# Patient Record
Sex: FEMALE | Race: WHITE | Employment: UNEMPLOYED | URBAN - METROPOLITAN AREA
[De-identification: names, ages, dates, MRNs, and addresses within clinical notes are randomized per-mention and may not be internally consistent; named-entity substitution may affect disease eponyms.]

---

## 2021-12-09 ENCOUNTER — THERAPY VISIT (OUTPATIENT)
Dept: PHYSICAL THERAPY | Facility: CLINIC | Age: 31
End: 2021-12-09
Payer: COMMERCIAL

## 2021-12-09 DIAGNOSIS — M25.531 RIGHT WRIST PAIN: ICD-10-CM

## 2021-12-09 DIAGNOSIS — M25.521 PAIN OF BOTH ELBOWS: ICD-10-CM

## 2021-12-09 DIAGNOSIS — M25.522 PAIN OF BOTH ELBOWS: ICD-10-CM

## 2021-12-09 PROCEDURE — 97140 MANUAL THERAPY 1/> REGIONS: CPT | Mod: GP | Performed by: PHYSICAL THERAPIST

## 2021-12-09 PROCEDURE — 97161 PT EVAL LOW COMPLEX 20 MIN: CPT | Mod: GP | Performed by: PHYSICAL THERAPIST

## 2021-12-09 PROCEDURE — 97110 THERAPEUTIC EXERCISES: CPT | Mod: GP | Performed by: PHYSICAL THERAPIST

## 2021-12-09 NOTE — PROGRESS NOTES
[facility]    OUTPATIENT Physical Therapy ORTHOPEDIC EVALUATION  PLAN OF TREATMENT FOR OUTPATIENT REHABILITATION  (COMPLETE FOR INITIAL CLAIMS ONLY)  Patient's Last Name, First Name, M.I.  YOB: 1990  Julissa Crowley    Provider s Name:  [facility]   Medical Record No.  [id number]   Start of Care Date:  12/09/21   Onset Date:   12/02/21   Type:     _X__PT   ___OT Medical Diagnosis:  No diagnosis found.     Treatment Diagnosis:  B wrist pain consistent with extensor tenosynovitis, decreased mobility of proximal carpal row, and proximal radio-ulnar restrictions         Goals:     12/09/21 0500   Body Part   Goals listed below are for R wrist   Other Goal   Activity Getting dressed   Previous Functional Level No restrictions   Performance Level Able to pull up pants IND   Current Functional Level Unable to pull up pants IND   STG Target Performance Pull up pants IND   Performance Level Difficulty level 5/10   Rationale To return to IND with self-care   Due Date 12/30/21   LTG Target Performance Dressing IND   Performance Level Difficulty level while dressing 2-3/10   Rationale To return to IND personal cares   Due Date 01/20/22       Therapy Frequency:  1x/wk  Predicted Duration of Therapy Intervention:  6wks    Hira Barron, PT                 I CERTIFY THE NEED FOR THESE SERVICES FURNISHED UNDER        THIS PLAN OF TREATMENT AND WHILE UNDER MY CARE .             Physician Signature               Date    X_____________________________________________________                             Certification Date From:  12/09/21   Certification Date To:  01/20/22    Referring Provider:  Karol Galdamez    Initial Assessment        See Epic Evaluation SOC Date: 12/09/21

## 2021-12-09 NOTE — PROGRESS NOTES
Physical Therapy Initial Evaluation  Subjective:  The history is provided by the patient. No  was used.   Patient Health History  Julissa Crowley being seen for R hand/finger.     Problem began: 11/9/2021.   Problem occurred: Started with numbness, then swelling, and stiffness   Pain is reported as 7/10 on pain scale.  General health as reported by patient is good.  Pertinent medical history includes: asthma, depression, diabetes, incontinence, mental illness, migraines/headaches, numbness/tingling, overweight and thyroid problems.   Red flags:  None as reported by patient.  Medical allergies: none.   Surgeries include:  Other. Other surgery history details: Gallbladder.    Current medications:  Anti-depressants, pain medication and thyroid medication.    Current occupation is [employer].      Other job/home tasks details: Doing activities, going to the [employer], etc.                Therapist Generated HPI Evaluation  Problem details: Unknown cause, saw the Dr and was told she had swelling in both hands, was given wrist braces for both arms. Symptoms improved on L, but R did not.    R hand is currently wrapped in an ace wrap with a brace. Pt reports being unable to move wrist or fingers currently and pain on back of hand from fingers to wrist. No pain in forearm..         Type of problem:  Right wrist.    This is a new condition.  Condition occurred with:  Insidious onset.  Where condition occurred: for unknown reasons.  Patient reports pain:  Dorsal and other (fingers all the way to the wrist).  Pain is described as cramping and sharp and is constant.  Pain radiates to:  Wrist and hand. Pain is the same all the time.  Since onset symptoms are unchanged.  Associated symptoms:  Edema and loss of motion/stiffness. Symptoms are exacerbated by activity (physically can't do anything with hand)  and relieved by other (None).  Special tests included:  X-ray.    Restrictions due to condition include:   Currently not working due to present treatment (Unable to participate in activities, has been staying at home).  Barriers include:  Requires assistance with ADL's.                        Objective:  System    Physical Exam    General     ROS  Julissa Crowley , : 1990, MRN: [id number]    Physical Therapy Objective Findings  Subjective information, goals, clinical impression, daily documentation and other information found in EPISODES tab.    Elbow Objective Findings  Posture: Significantly slouched and forward head posture in seated and standing  Cervical Screen:      Positive                                            Negative                                             ROM WNL, but sharp pain present during SB    Spurling s  X   ULTT #1     ULTT #2     ULTT #3     ULTT #4     CRLF Test     Other:       Range of Motion:                                            Right AROM            Right PROM            Left AROM              Left PROM                Elbow Flex/Ext WNL  WNL    Wrist Flex 4  67    Wrist Ext 35  80    Radial Dev moderately restricted  Mildly restricted    Ulnar Dev Moderately restricted  Mildly restricted    Supination Moderately restricted and stiff  Mildly restricted initially, improved to WNL after radial head post glide MWM    Pronation         Manual Muscle Testing  (graded 0-5, measured at 0 degrees unless otherwise noted):                                                                  Right                                                  Left                                                     Biceps Brachii     Brachialis     Brachioradialis     Triceps Brachii     Flexor Carpi Radialis     Flexor Carpi Ulnaris     Extensor Carpi Radialis Longus 3, pain ++    Extensor Carpi Radialis Brevis 3, pain ++    Extensor Carpi Ulnaris 3-, pain ++    OTHER flexor digitorum 3+, pain +    OTHER      Strength (lbs)  at 0 degrees extension at 0 degrees extension    Strength (lbs) at  90 degrees flexion at 90 degrees flexion   + - mild pain, ++ - mod pain, +++ - severe pain  Special Tests:                                                                  Positive                                            Negative                                             Lateral Epicondylitis Test     Medial Epicondylitis Test     Phalen s     Reverse Phalen s     Finkelstein s Test     Varus Stress Test     Valgus Stress Test     Other: hyperflexion    Hyperextension    Tinel's radial n.    Tinel's ulnar n.   Pinching pain on L        Tingling on L No pain on R    X B    No symptoms on R    X B     Joint Mobility Testing:   Humerus/radius: WNL on the R, anteriorly located radial head on the L   Humerus/ulna: WNL   Proximal radius/ulna: Painful with overpressure on the R    Palpation: Painful with palpation of proximal carpal row, painful with palpation to extensor forearm    Assessment/Plan:    Patient is a 31 year old female with right>left wrist/hand complaints.    Patient has the following significant findings with corresponding treatment plan.                Diagnosis 1:  B wrist pain consistent with extensor tenosynovitis, decreased mobility of proximal carpal row, and proximal radio-ulnar restrictions    Pain -  hot/cold therapy, US, electric stimulation, manual therapy, splint/taping/bracing/orthotics, self management, education and home program  Decreased ROM/flexibility - manual therapy, therapeutic exercise, therapeutic activity and home program  Decreased joint mobility - manual therapy, therapeutic exercise, therapeutic activity and home program  Decreased strength - therapeutic exercise, therapeutic activities and home program  Decreased function - therapeutic activities and home program    Therapy Evaluation Codes:   1) History comprised of:   Personal factors that impact the plan of care:      Anxiety, Cognition, Motivation.    Comorbidity factors that impact the plan of care are:      Asthma,  Diabetes, Depression, Mental illness, Migraines/headaches, Numbness/tingling, Overweight and Thyroid and incontinence.     Medications impacting care: Anti-depressant, Pain and thyroid.  2) Examination of Body Systems comprised of:   Body structures and functions that impact the plan of care:      Elbow and Wrist.   Activity limitations that impact the plan of care are:      Bathing, Driving, Dressing, Grasping and Lifting.  3) Clinical presentation characteristics are:   Stable/Uncomplicated.  4) Decision-Making    Low complexity using standardized patient assessment instrument and/or measureable assessment of functional outcome.  Cumulative Therapy Evaluation is: Low complexity.    Previous and current functional limitations:  (See Goal Flow Sheet for this information)    Short term and Long term goals: (See Goal Flow Sheet for this information)     Communication ability:  Patient appears to be able to clearly communicate and understand verbal and written communication and follow directions correctly.  Treatment Explanation - The following has been discussed with the patient:   RX ordered/plan of care  Anticipated outcomes  Possible risks and side effects  This patient would benefit from PT intervention to resume normal activities.   Rehab potential is good.    Frequency:  1 X week, once daily  Duration:  for 6 weeks  Discharge Plan:  Achieve all LTG.  Independent in home treatment program.  Reach maximal therapeutic benefit.    Please refer to the daily flowsheet for treatment today, total treatment time and time spent performing 1:1 timed codes.     Kera Krueger, SPT  Hira Barron,PT, DPT, OCS

## 2022-02-07 PROBLEM — M25.521 PAIN OF BOTH ELBOWS: Status: RESOLVED | Noted: 2021-12-09 | Resolved: 2022-02-07

## 2022-02-07 PROBLEM — M25.531 RIGHT WRIST PAIN: Status: RESOLVED | Noted: 2021-12-09 | Resolved: 2022-02-07

## 2022-02-07 PROBLEM — M25.522 PAIN OF BOTH ELBOWS: Status: RESOLVED | Noted: 2021-12-09 | Resolved: 2022-02-07

## 2022-02-07 NOTE — PROGRESS NOTES
Patient seen for one time evaluation and treatment.  Patient did not return for further treatment and current status is unknown.  Please see initial evaluation for further information.    Hira Barron,PT, DPT, OCS